# Patient Record
Sex: FEMALE | Race: BLACK OR AFRICAN AMERICAN | Employment: OTHER | URBAN - METROPOLITAN AREA
[De-identification: names, ages, dates, MRNs, and addresses within clinical notes are randomized per-mention and may not be internally consistent; named-entity substitution may affect disease eponyms.]

---

## 2017-07-31 NOTE — PATIENT DISCUSSION
(N28.952) Keratoconjunct sicca, not specified as Sjogren's, bilateral - Assesment : Examination revealed Dry Eye Syndrome Punctate erosions - Plan : Monitor for changes. Advised patient to call our office with decreased vision or increased symptoms.  Continue artificial tears 3-4 times a day Start systane gel qhs OU Rtc 1 year Exam

## 2018-11-16 NOTE — PATIENT DISCUSSION
(N23.286) Keratoconjunct sicca, not specified as Sjogren's, bilateral - Assesment : Examination revealed Dry Eye Syndrome Few inf punctate erosions minimal - Plan : Monitor for changes. Advised patient to call our office with decreased vision or increased symptoms.  Continue artificial tears 3-4 times a day Continue systane gel qhs OU Rtc 1 year Exam

## 2018-11-16 NOTE — PATIENT DISCUSSION
(G43.B0) Ophthalmoplegic migraine, not intractable - Assesment : Examination revealed ocular migraine. Patient had 2 recent episodes back to back. Pt denies h/o migraine headaches. Advised patient if these become more persistent recommend further work up with neurologist as these can sometimes be a precursor to a stroke.  - Plan : Observation Follow up wit neurologist as scheduled

## 2021-02-09 ENCOUNTER — PREPPED CHART (OUTPATIENT)
Dept: URBAN - METROPOLITAN AREA CLINIC 19 | Facility: CLINIC | Age: 61
End: 2021-02-09

## 2021-02-09 RX ORDER — ERYTHROMYCIN 5 MG/G: OINTMENT OPHTHALMIC TWICE A DAY

## 2021-03-17 NOTE — PATIENT DISCUSSION
Advised patient we would need to wait a few weeks before we can use our procedure room to remove lash.

## 2021-04-15 NOTE — PROCEDURE NOTE: CLINICAL
PROCEDURE NOTE: Epilation Right Upper Lid. Diagnosis: Other Disorders of Eyelid. Anesthesia: Topical. Prior to treatment, the risks/benefits/alternatives were discussed. The patient wished to proceed with procedure. Aberrant lashes removed from RUL lid(s) using microforcep. Patient tolerated procedure well. There were no complications. Post-op instructions given. Jay Hunt

## 2022-02-09 ENCOUNTER — COMPREHENSIVE EXAM (OUTPATIENT)
Dept: URBAN - METROPOLITAN AREA CLINIC 19 | Facility: CLINIC | Age: 62
End: 2022-02-09

## 2022-02-09 DIAGNOSIS — H16.143: ICD-10-CM

## 2022-02-09 DIAGNOSIS — H52.223: ICD-10-CM

## 2022-02-09 DIAGNOSIS — H25.813: ICD-10-CM

## 2022-02-09 PROCEDURE — 92015 DETERMINE REFRACTIVE STATE: CPT

## 2022-02-09 PROCEDURE — 92014 COMPRE OPH EXAM EST PT 1/>: CPT

## 2022-02-09 RX ORDER — ERYTHROMYCIN 5 MG/G: OINTMENT OPHTHALMIC TWICE A DAY

## 2022-02-09 ASSESSMENT — KERATOMETRY
OS_K2POWER_DIOPTERS: 45.25
OD_K1POWER_DIOPTERS: 41.25
OD_AXISANGLE2_DEGREES: 97
OS_AXISANGLE2_DEGREES: 92
OS_AXISANGLE_DEGREES: 2
OS_K1POWER_DIOPTERS: 41.25
OD_AXISANGLE_DEGREES: 7
OD_K2POWER_DIOPTERS: 45.25

## 2022-02-09 ASSESSMENT — TONOMETRY
OD_IOP_MMHG: 11
OS_IOP_MMHG: 11

## 2022-02-09 ASSESSMENT — VISUAL ACUITY
OS_CC: 20/30
OU_CC: 20/25
OD_CC: 20/30-1

## 2022-02-09 NOTE — PATIENT DISCUSSION
start ramon bid OU for 1 week then qhs for another week. , wear sleep susan at bedtime if needs ceiling fan.

## 2023-04-05 ENCOUNTER — ESTABLISHED PATIENT (OUTPATIENT)
Dept: URBAN - METROPOLITAN AREA CLINIC 19 | Facility: CLINIC | Age: 63
End: 2023-04-05

## 2023-04-05 DIAGNOSIS — H52.223: ICD-10-CM

## 2023-04-05 DIAGNOSIS — H04.123: ICD-10-CM

## 2023-04-05 DIAGNOSIS — H16.143: ICD-10-CM

## 2023-04-05 DIAGNOSIS — H25.813: ICD-10-CM

## 2023-04-05 PROCEDURE — 92014 COMPRE OPH EXAM EST PT 1/>: CPT

## 2023-04-05 PROCEDURE — 92015 DETERMINE REFRACTIVE STATE: CPT

## 2023-04-05 RX ORDER — ERYTHROMYCIN 5 MG/G: OINTMENT OPHTHALMIC TWICE A DAY

## 2023-04-05 ASSESSMENT — KERATOMETRY
OD_K2POWER_DIOPTERS: 45.50
OS_AXISANGLE2_DEGREES: 77
OS_K2POWER_DIOPTERS: 45.25
OD_AXISANGLE_DEGREES: 180
OD_AXISANGLE2_DEGREES: 90
OS_AXISANGLE_DEGREES: 167
OS_K1POWER_DIOPTERS: 41.25
OD_K1POWER_DIOPTERS: 41.25

## 2023-04-05 ASSESSMENT — VISUAL ACUITY
OU_CC: 20/25-2
OS_CC: 20/30-2
OD_CC: 20/30-1

## 2023-04-05 ASSESSMENT — TONOMETRY
OS_IOP_MMHG: 18
OD_IOP_MMHG: 18

## 2024-01-29 ENCOUNTER — ESTABLISHED PATIENT (OUTPATIENT)
Dept: URBAN - METROPOLITAN AREA CLINIC 19 | Facility: CLINIC | Age: 64
End: 2024-01-29

## 2024-01-29 DIAGNOSIS — H25.813: ICD-10-CM

## 2024-01-29 DIAGNOSIS — H43.392: ICD-10-CM

## 2024-01-29 PROCEDURE — 99213 OFFICE O/P EST LOW 20 MIN: CPT

## 2024-01-29 ASSESSMENT — VISUAL ACUITY
OS_CC: 20/40-2
OS_PH: 20/30+2
OD_CC: 20/30-2

## 2024-01-29 ASSESSMENT — KERATOMETRY
OD_K1POWER_DIOPTERS: 41.25
OD_AXISANGLE2_DEGREES: 88
OD_AXISANGLE_DEGREES: 178
OS_K1POWER_DIOPTERS: 41.5
OS_AXISANGLE2_DEGREES: 78
OS_AXISANGLE_DEGREES: 168
OD_K2POWER_DIOPTERS: 45.5
OS_K2POWER_DIOPTERS: 45.5

## 2024-01-29 ASSESSMENT — TONOMETRY
OS_IOP_MMHG: 18
OD_IOP_MMHG: 18

## 2024-04-08 ENCOUNTER — ESTABLISHED PATIENT (OUTPATIENT)
Dept: URBAN - METROPOLITAN AREA CLINIC 19 | Facility: CLINIC | Age: 64
End: 2024-04-08

## 2024-04-08 DIAGNOSIS — H25.813: ICD-10-CM

## 2024-04-08 DIAGNOSIS — H43.392: ICD-10-CM

## 2024-04-08 DIAGNOSIS — H52.223: ICD-10-CM

## 2024-04-08 DIAGNOSIS — H04.123: ICD-10-CM

## 2024-04-08 PROCEDURE — 92014 COMPRE OPH EXAM EST PT 1/>: CPT

## 2024-04-08 PROCEDURE — 92015 DETERMINE REFRACTIVE STATE: CPT

## 2024-04-08 ASSESSMENT — KERATOMETRY
OS_AXISANGLE_DEGREES: 168
OS_K1POWER_DIOPTERS: 41.5
OD_K2POWER_DIOPTERS: 45.5
OS_AXISANGLE2_DEGREES: 78
OD_K1POWER_DIOPTERS: 41.5
OD_AXISANGLE_DEGREES: 180
OS_K2POWER_DIOPTERS: 45
OD_AXISANGLE2_DEGREES: 90

## 2024-04-08 ASSESSMENT — VISUAL ACUITY
OU_CC: 20/30
OD_CC: 20/25-2

## 2024-04-08 ASSESSMENT — TONOMETRY
OD_IOP_MMHG: 22
OS_IOP_MMHG: 22

## 2025-07-31 ENCOUNTER — COMPREHENSIVE EXAM (OUTPATIENT)
Age: 65
End: 2025-07-31

## 2025-07-31 DIAGNOSIS — H52.223: ICD-10-CM

## 2025-07-31 DIAGNOSIS — H04.123: ICD-10-CM

## 2025-07-31 DIAGNOSIS — H43.392: ICD-10-CM

## 2025-07-31 DIAGNOSIS — H25.813: ICD-10-CM

## 2025-07-31 PROCEDURE — 92015 DETERMINE REFRACTIVE STATE: CPT

## 2025-07-31 PROCEDURE — 92014 COMPRE OPH EXAM EST PT 1/>: CPT
